# Patient Record
Sex: FEMALE | URBAN - METROPOLITAN AREA
[De-identification: names, ages, dates, MRNs, and addresses within clinical notes are randomized per-mention and may not be internally consistent; named-entity substitution may affect disease eponyms.]

---

## 2017-03-13 NOTE — PATIENT DISCUSSION
11 9 15 ^ IOP OD PROB STEROID INDUCED RECOM F/U [facility] TOMORROW GIVEN HX OF DIFFERENT DROP USE TO DETERMINE OPTIMUM TREATMENT AND F/U

## 2017-03-13 NOTE — PATIENT DISCUSSION
S/P 1ST ILUVIEN 4 15 15 DURING WHICH IMPLANT DID NOT COME OUT OF INJECTOR AND WAS FOUND IN THE INJECTION DEVICE BY THE

## 2017-03-13 NOTE — PROCEDURE NOTE: CLINICAL
PROCEDURE NOTE: Lucentis 0.3mg OD. Diagnosis: Diabetic Macular Edema. Anesthesia: Topical. Prep: Betadine Flush. Prior to injection, risks/benefits/alternatives discussed including infection, loss of vision, hemorrhage, cataract, glaucoma, retinal tears or detachment and patient wished to proceed. Topical anesthesia was induced with Alcaine. Additional anesthesia was achieved using drop(s) or injection checked above. A drop of Povidone-iodine 5% ophthalmic solution was instilled over the injection site and in the inferior fornix. Betadine prep was performed. Using the syringe provided, Lucentis 0.3 mg in 0.05 cc was injected into the vitreous cavity. The needle was passed 3.0 mm posterior to the limbus in pseudophakic patients, and 3.5 mm posterior to the limbus in phakic patients. The remainder of the Lucentis 0.3mg in the single-use vial was then discarded in a medical waste disposal container. A single use vial of intravitreal Lucentis 0.3mg/0.05ml was used and excess discarded. The eye was irrigated with sterile irrigating solution. Patient tolerated the procedure well. There were no complications. Post procedure instructions given. Injection Time 1:10PM. The patient was instructed to return for re-evaluation in approximately 4-12 weeks depending on his/her condition and was told to call immediately if vision decreases and/or if his/her eye becomes red, painful, and/or light sensitive. The patient was instructed to go to the emergency room or call 911 if unable to reach the doctor within an hour or two of trying or calling. The patient was instructed to use Artificial Tears q.i.d. p.r.n for comfort. Chito Lara

## 2017-03-13 NOTE — PATIENT DISCUSSION
We reviewed the signs and symptoms of retinal tear/retinal detachment and the importance of prompt evaluation should there be increasing floaters, new flashing lights, or decreasing peripheral vision in either eye at any time.

## 2017-03-13 NOTE — PATIENT DISCUSSION
DAN-Ref: Within THE NEXT DAY OR TWO, Phone # [phone] Secondary Phone # ( [phone] ), Type of referral:FOLLOW UP , Comments:EVALUATION FOR GLAUCOMA POSSIBLY CAUSED BY STEROID INJECTION . End DAN-Ref.

## 2017-06-19 NOTE — PATIENT DISCUSSION
S/P 1ST ILUVIEN 4 15 15 DURING WHICH IMPLANT DID NOT COME OUT OF INJECTOR AND WAS FOUND IN THE INJECTION DEVICE BY THE .

## 2017-06-19 NOTE — PATIENT DISCUSSION
LUCENTIS AND REEVAL IN 6 WKS TO SEE IF IT IS IMPROVING THAN THE EFFECT IS WEARING OFF  - MODERATE EDEMA WITOUT IMPROVEMENT.

## 2017-06-19 NOTE — PROCEDURE NOTE: CLINICAL
PROCEDURE NOTE: Lucentis 0.3mg OD. Diagnosis: Diabetic Macular Edema. Anesthesia: Topical. Prep: Betadine Flush. Prior to injection, risks/benefits/alternatives discussed including infection, loss of vision, hemorrhage, cataract, glaucoma, retinal tears or detachment and patient wished to proceed. Topical anesthesia was induced with Alcaine. Additional anesthesia was achieved using drop(s) or injection checked above. A drop of Povidone-iodine 5% ophthalmic solution was instilled over the injection site and in the inferior fornix. Betadine prep was performed. Using the syringe provided, Lucentis 0.3 mg in 0.05 cc was injected into the vitreous cavity. The needle was passed 3.0 mm posterior to the limbus in pseudophakic patients, and 3.5 mm posterior to the limbus in phakic patients. The remainder of the Lucentis 0.3mg in the single-use vial was then discarded in a medical waste disposal container. A single use vial of intravitreal Lucentis 0.3mg/0.05ml was used and excess discarded. The eye was irrigated with sterile irrigating solution. Patient tolerated the procedure well. There were no complications. Post procedure instructions given. Injection Time 11:14 AM. The patient was instructed to return for re-evaluation in approximately 4-12 weeks depending on his/her condition and was told to call immediately if vision decreases and/or if his/her eye becomes red, painful, and/or light sensitive. The patient was instructed to go to the emergency room or call 911 if unable to reach the doctor within an hour or two of trying or calling. The patient was instructed to use Artificial Tears q.i.d. p.r.n for comfort. Wilfred Monteiro

## 2017-06-19 NOTE — PATIENT DISCUSSION
DAN-Ref: Within THE NEXT DAY OR TWO, Phone # [phone] Secondary Phone # ( [phone] ), Type of referral:FOLLOW UP , Comments:EVALUATION FOR GLAUCOMA POSSIBLY CAUSED BY STEROID INJECTION . End DAN-Ref.

## 2017-06-19 NOTE — PATIENT DISCUSSION
11 9 15 ^ IOP OD PROB STEROID INDUCED RECOM F/U [facility] TOMORROW GIVEN HX OF DIFFERENT DROP USE TO DETERMINE OPTIMUM TREATMENT AND F/U.

## 2017-07-31 NOTE — PATIENT DISCUSSION
OZURDEX - MODERATE EDEMA WITH SMALL IMPROVMENT AT 6 WKS S/P LUCENTIS SUPPLMENT - TO SEE IF OZURDEX WORKS BETTER.

## 2017-07-31 NOTE — PATIENT DISCUSSION
ILUVIEN - IMCREASING EDEMA WITH LOSS 5 LETTERS - MEETS FAME STUDY RETX CRITERIA - NO SIG STEROID RESPONSE OS.

## 2017-07-31 NOTE — PROCEDURE NOTE: CLINICAL
PROCEDURE NOTE: Intravitreal Iluvien OS. Diagnosis: Diabetic Macular Edema. Patient has been previously treated with a course of corticosteroids and did not have a clinically significant rise in intraocular pressure. Anesthesia was achieved using drop(s) or injection checked above. Subconjunctival xylocaine 2% approx. 0.5cc was given for anesthesia. A drop of Proparacaine 0.5% was instilled followed by Povidone-iodine 5% over the injection site. The Iluvien drug implant (fluocinolone acetonide intravitreal implant 0.19mg) was injected into the vitreous cavity. The needle was passed 3.0 mm posterior to the limbus in pseudophakic patients, and 3.5 mm posterior to the limbus in phakic patients. The eye was stabilized using a q tip or cotton tip applicator, and the conjunctiva was displaced from the injection site. There was no evidence of hemorrhage, subretinal injection, or retinal detachment. Time of Injection *. The eye was then irrigated with a sterile eye wash, the patient tolerated the procedure well, and there were no complications from the procedure. The patient was instructed to follow up in approximately 2 weeks for an exam and/or pressure check and was told to call immediately if the vision decreases and/or if the patient's eye becomes red, painful, and/or light sensitive. If the patient is unable to reach the doctor within an hour or two, the patient was instructed to go to the emergency room or call 911. Patient was instructed to return in 6 weeks for a followup exam. The patient was instructed to use Artificial Tears q.i.d. p.r.n for comfort. ILUVIEN WELL SEEN IN VIT CAVITY DURING INJECTION. Kailyn Avendano

## 2017-10-05 NOTE — PROCEDURE NOTE: CLINICAL
PROCEDURE NOTE: Intravitreal Iluvien OD. Diagnosis: Diabetic Macular Edema. Anesthesia: Akten Gel 3.5%. Prep: Betadine Drops. Patient has been previously treated with a course of corticosteroids and did not have a clinically significant rise in intraocular pressure. Anesthesia was achieved using drop(s) or injection checked above. Subconjunctival xylocaine 2% approx. 0.5cc was given for anesthesia. A drop of Proparacaine 0.5% was instilled followed by Povidone-iodine 5% over the injection site. The Iluvien drug implant (fluocinolone acetonide intravitreal implant 0.19mg) was injected into the vitreous cavity. The needle was passed 3.0 mm posterior to the limbus in pseudophakic patients, and 3.5 mm posterior to the limbus in phakic patients. The eye was stabilized using a q tip or cotton tip applicator, and the conjunctiva was displaced from the injection site. There was no evidence of hemorrhage, subretinal injection, or retinal detachment. Time of Injection 11:10 AM. The eye was then irrigated with a sterile eye wash, the patient tolerated the procedure well, and there were no complications from the procedure. The patient was instructed to follow up in approximately 2 weeks for an exam and/or pressure check and was told to call immediately if the vision decreases and/or if the patient's eye becomes red, painful, and/or light sensitive. If the patient is unable to reach the doctor within an hour or two, the patient was instructed to go to the emergency room or call 911. Patient was instructed to return in 6 weeks for a followup exam. The patient was instructed to use Artificial Tears q.i.d. p.r.n for comfort. ILUVIEN SEEN DURING AND AFTER INJECTION WITH 20D LENS. Kyrie Ibarra

## 2017-10-05 NOTE — PATIENT DISCUSSION
ILUVEIEN - MODERATE EDEMA WITH OVER A 5 LETTER LOSS OF VA - RECOM REPEAT ILUVIEN AS OS DID WELL AFTER RECENT ILUVIEN OS.

## 2017-11-28 NOTE — PATIENT DISCUSSION
DUANE 10 5 17 - MODERATE EDEMA WITH OVER A 5 LETTER LOSS OF VA - RECOM REPEAT ILUVIEN AS OS DID WELL AFTER RECENT ILUVIEN OS.

## 2017-11-28 NOTE — PATIENT DISCUSSION
[address] [facility] 20[address] [place] [address].

## 2018-02-28 ENCOUNTER — MOHS SURGERY-ROUTINE (OUTPATIENT)
Dept: URBAN - METROPOLITAN AREA CLINIC 12 | Facility: CLINIC | Age: 63
Setting detail: DERMATOLOGY
End: 2018-02-28

## 2018-03-07 ENCOUNTER — SUTURE REMOVAL (OUTPATIENT)
Dept: URBAN - METROPOLITAN AREA CLINIC 12 | Facility: CLINIC | Age: 63
Setting detail: DERMATOLOGY
End: 2018-03-07

## 2018-03-07 DIAGNOSIS — L57.0 ACTINIC KERATOSIS: ICD-10-CM

## 2018-03-07 PROCEDURE — 99024 POSTOP FOLLOW-UP VISIT: CPT

## 2018-07-11 ENCOUNTER — OTHER- (OUTPATIENT)
Dept: URBAN - METROPOLITAN AREA CLINIC 12 | Facility: CLINIC | Age: 63
Setting detail: DERMATOLOGY
End: 2018-07-11

## 2018-07-11 DIAGNOSIS — L30.4 ERYTHEMA INTERTRIGO: ICD-10-CM

## 2018-07-11 DIAGNOSIS — L81.2 FRECKLES: ICD-10-CM

## 2018-07-11 PROCEDURE — 99212 OFFICE O/P EST SF 10 MIN: CPT

## 2018-11-09 NOTE — PATIENT DISCUSSION
12 5 16 ^ EDEMA WITH REDUCTION IN VA - RECOM L TX AS HAS DONE WELL WITH LUCENTIS IN PAST. normal... Well appearing, well nourished, awake, alert, oriented to person, place, time/situation and in no apparent distress.

## 2020-01-15 NOTE — PATIENT DISCUSSION
Outpatient Occupational Therapy Neuro Initial Evaluation   [place]     Patient Name: Cindy Mejia  : 1946  MRN: [id number]  Today's Date: 1/15/2020      Visit Date: 01/15/2020    Patient Active Problem List   Diagnosis   • Anxiety   • Tubular adenoma   • Tremor of both hands   • B12 deficiency   • Parkinson disease (CMS/HCC)   • Action tremor   • Mixed stress and urge urinary incontinence        Past Medical History:   Diagnosis Date   • Anxiety 2016   • Asymptomatic varicose veins of both lower extremities 2019   • Carcinoma of colon (CMS/HCC) 2018   • Colon polyp    • Constipation    • Menopause 3/23/2017   • Mixed stress and urge urinary incontinence    • Other hemorrhoids 2018   • Other rosacea 2018   • Uterine prolapse 2016        Past Surgical History:   Procedure Laterality Date   • BLADDER SURGERY     • COLONOSCOPY N/A 2017    Procedure: COLONOSCOPY, polypectomy;  Surgeon: Rabia Mcelroy MD;  Location: [facility];  Service:    • COLONOSCOPY N/A 2017    Procedure: COLONOSCOPY, polypectomy, biopsy, sclerotherapy injection;  Surgeon: Rabia Mcelroy MD;  Location: [facility] OR;  Service:    • HYSTERECTOMY     • TUBAL ABDOMINAL LIGATION           Visit Dx:      ICD-10-CM ICD-9-CM   1. Parkinson disease (CMS/HCC) G20 332.0   2. Upper extremity weakness R29.898 729.89       Patient History     Row Name 01/15/20 1100             History    Chief Complaint  Difficulty with daily activities;Muscle weakness;Joint stiffness  -SD      Date Current Problem(s) Began  -- symptoms have progressively worsened for approx 2 years  -SD      Brief Description of Current Complaint  Pt with history of Parkinsons and reports declining function over the past few years. She states she has some UE tremors and has noted UE weakness and decreased functional coordination which has impacted her performance with daily tasks. Pt also reports some intermittent numbness/tingling of BUE. Pt has  RECOM F/U DR BUNCH FOR ONGOING MANAGEMENT AND MONITORING OF GLAUCOMA. "an EMG scheduled in March 2020 regarding the UE sensory issues.  -SD      Patient/Caregiver Goals  Other (comment) \"move better\", \"write better\"  -SD      Current Tobacco Use  no  -SD      Smoking Status  never  -SD      Patient's Rating of General Health  Good  -SD      Hand Dominance  right-handed  -SD      Occupation/sports/leisure activities  retired hairdresser  -SD      Patient seeing anyone else for problem(s)?  Physical therapy  -SD      How has patient tried to help current problem?  home exercises  -SD      Are you or can you be pregnant  No  -SD         Pain     Pain Location  -- pt reports no pain at this time  -SD      Is your sleep disturbed?  No  -SD         Fall Risk Assessment    Any falls in the past year:  Yes  -SD      Number of falls reported in the last 12 months  1  -SD      Factors that contributed to the fall:  Tripped \"new shoe caught on the floor surface\"  -SD      Does patient have a fear of falling  Yes (comment) \"I always have a fear of falling\"   -SD         Services    Prior Rehab/Home Health Experiences  No  -SD      Are you currently receiving Home Health services  No  -SD         Daily Activities    Primary Language  English  -SD      Are you able to read  Yes  -SD      Are you able to write  Yes  -SD      How does patient learn best?  Listening  -SD      Teaching needs identified  Home Exercise Program;Management of Condition  -SD      Patient is concerned about/has problems with  Difficulty with self care (i.e. bathing, dressing, toileting:;Writing/grasping items with hand(s);Grasping objects lifting  -SD      Does patient have problems with the following?  Anxiety  -SD      Recommended Referrals  -- pt has consult with physical therapy  -SD      Pt Participated in POC and Goals  Yes  -SD         Safety    Are you being hurt, hit, or frightened by anyone at home or in your life?  No  -SD      Are you being neglected by a caregiver  No  -SD        User Key  (r) = Recorded By, (t) " = Taken By, (c) = Cosigned By    Initials Name Provider Type    Camilo Potter, OTR Occupational Therapist          OT Neuro     Row Name 01/15/20 1100             Subjective Comments    Subjective Comments  Pt states she has diffficulty with many everyday tasks due to hand tremors/weakness (ex, writing, cutting food, pulling up clothing, performing light grooming tasks)  -SD         Home Living    Living Arrangements  house  -SD         Cognitive Assessment/Intervention    Current Cognitive/Communication Assessment  functional  -SD      Orientation Status (Cognition)  oriented x 4  -SD         Sensation    Sensation WNL?  WFL  -SD      Additional Comments  Pt reports interittent numbness/tingling radiating up both UE's. No issues at present time  -SD         Coordination    Coordination Observations  Resting Tremor mild resting tremor  -SD      Resting Tremor  Bilteral: mild  -SD      Other Coordination Observations  decrease fmc skills  -SD      9-Hole Peg Left  43  -SD      9-Hole Peg Right  53  -SD         General ROM    GENERAL ROM COMMENTS  BUE rom wfl (with exception of hands/pt is not able to achieve a loose gross grasp with bilateral hands due to joint stiffness/weakness)  -SD         MMT (Manual Muscle Testing)    General MMT Comments  BUE 3+/5  -SD        User Key  (r) = Recorded By, (t) = Taken By, (c) = Cosigned By    Initials Name Provider Type    Camilo Potter, OTR Occupational Therapist             Hand Therapy (last 24 hours)      Hand Eval     Row Name 01/15/20 1100             Left Extension AROM    II- MP AROM  0  -SD      II- PIP AROM  0  -SD      II- DIP AROM  -17  -SD      II- RAI Left Extension AROM  -17  -SD      III- MP AROM  -12  -SD      III- PIP AROM  0  -SD      III- DIP AROM  -15  -SD      III- RAI Left Extension AROM  -27  -SD      IV- MP AROM  -10  -SD      IV- PIP AROM  0  -SD      IV- DIP AROM  -14  -SD      IV- RAI Left Extension AROM  -24  -SD      V- MP AROM  0  -SD       V- PIP AROM  0  -SD      V- DIP AROM  -19  -SD      V- RAI Left Extension AROM  -19  -SD         Left Flexion AROM    II- MP AROM  85  -SD      II- PIP AROM  50  -SD      II- DIP AROM  41  -SD      II- RAI Left Flexion AROM  176  -SD      III- MP AROM  95  -SD      III- PIP AROM  42  -SD      III- DIP AROM  35  -SD      III- RAI Left Flexion AROM  172  -SD      IV- MP AROM  95  -SD      IV- PIP AROM  34  -SD      IV- DIP AROM  24  -SD      IV- RAI Left Flexion AROM  153  -SD      V- MP AROM  95  -SD      V- PIP AROM  39  -SD      V- DIP AROM  25  -SD      V- RAI Left Flexion AROM  159  -SD         Right Extension AROM    II- MP AROM  -10  -SD      II- PIP AROM  0  -SD      II- DIP AROM  -5  -SD      II- RAI Right Extension AROM  -15  -SD      III- MP AROM  -10  -SD      III- PIP AROM  0  -SD      III- DIP AROM  -26  -SD      III- RAI Right Extension AROM  -36  -SD      IV- MP AROM  0  -SD      IV- PIP AROM  -10  -SD      IV- DIP AROM  -17  -SD      IV- RAI Right Extension AROM  -27  -SD      V- MP AROM  0  -SD      V- PIP AROM  -15  -SD      V- DIP AROM  -15  -SD      V- RAI Right Extension AROM  -30  -SD         Right Flexion AROM    II- MP AROM  84  -SD      II- PIP AROM  44  -SD      II- DIP AROM  31  -SD      II- RAI Right Flexion AROM  159  -SD      III- MP AROM  89  -SD      III- PIP AROM  44  -SD      III- DIP AROM  31  -SD      III- RAI Right Flexion AROM  164  -SD      IV- MP AROM  85  -SD      IV- PIP AROM  51  -SD      IV- DIP AROM  60  -SD      IV- RAI Right Flexion AROM  196  -SD      V- MP AROM  86  -SD      V- PIP AROM  39  -SD      V- DIP AROM  19  -SD      V- RAI Right Flexion AROM  144  -SD         Right Thumb AROM    MP Flexion - AROM  47  -SD      IP Flexion - AROM  33  -SD         Left Thumb AROM    MP Flexion - AROM  61  -SD      IP Flexion - AROM  33  -SD          Strength Right    # Reps  1  -SD      Right Rung  2  -SD      Right  Test 3  18  -SD       Strength Average Right   18  -SD          Strength Left    # Reps  1  -SD      Left Rung  2  -SD      Left  Test 3  15  -SD       Strength Average Left  15  -SD         Right Hand Strength - Pinch (lbs)    Lateral  10 lbs  -SD         Left Hand Strength - Pinch (lbs)    Lateral  9 lbs  -SD        User Key  (r) = Recorded By, (t) = Taken By, (c) = Cosigned By    Initials Name Provider Type    Camilo Potter OTR Occupational Therapist              Therapy Education  Education Details: Education regarding OT services, HEP to address hand rom/strength and compensatory strategies for UE tremors/hand weakness/joint stiffness  Given: HEP, Symptoms/condition management  Program: New  How Provided: Verbal, Demonstration  Provided to: Patient  Level of Understanding: Teach back education performed, Verbalized, Demonstrated    OT Goals     Row Name 01/15/20 1100          OT Short Term Goals    STG Date to Achieve  01/29/20  -SD     STG 1  Pt to be independent with HEP for hand rom/strength to increase independence with adl tasks.  -SD     STG 1 Progress  New  -SD     STG 2  Pt to improve functional coordination for writing activity using compensatory strategies to allow for participation in preferred leisure activity (soduko)  -SD     STG 2 Progress  New  -SD        Long Term Goals    LTG Date to Achieve  02/12/20  -SD     LTG 1  Pt to utilize AE/compensatory strategies to increase functional indepence with light grooming/adl tasks.  -SD     LTG 1 Progress  New  -SD     LTG 2  Pt to improve Bilateral hand strength by 5# to allow for improved performance of home management activity.  -SD     LTG 2 Progress  New  -SD       User Key  (r) = Recorded By, (t) = Taken By, (c) = Cosigned By    Initials Name Provider Type    Camilo Potter OTR Occupational Therapist                 OT Assessment/Plan     Row Name 01/15/20 1403          OT Assessment    Functional Limitations  Performance in self-care ADL;Limitation in home  management;Performance in leisure activities  -SD     Impairments  Range of motion;Muscle strength;Coordination  -SD     Assessment Comments  Pt presents with bilateral joint stiffness/weakness in her hands, and she has a history of Parkinson's disease with mild UE tremors. These factors are impacting her coordination and ability to manage basic adl tasks independently.   -SD     OT Diagnosis  Parkinson's, UE tremors, bilateral hand weakness/joint stiffness  -SD     OT Rehab Potential  Good  -SD     Patient/caregiver participated in establishment of treatment plan and goals  Yes  -SD     Patient would benefit from skilled therapy intervention  Yes  -SD        OT Plan    OT Frequency  1x/week  -SD     Predicted Duration of Therapy Intervention (Therapy Eval)  4 weeks  -SD     Planned CPT's?  OT EVAL LOW COMPLEXITY: 97809;OT THER ACT EA 15 MIN: [id number];OT HOT/COLD PACK  -SD     Planned Therapy Interventions (Optional Details)  home exercise program;ROM (Range of Motion);strengthening;other (see comments) Education with strategies to compensate for UE tremors/weakness/limited hand rom.  -SD     OT Plan Comments  Rec OT weekly x 4 weeks to establish BUE rom/strengthening program and  for education with compensatory strategies to increase her functional independence with basic daily tasks   -SD       User Key  (r) = Recorded By, (t) = Taken By, (c) = Cosigned By    Initials Name Provider Type    SD Camilo Da Silva, OTR Occupational Therapist        OT Exercises     Row Name 01/15/20 1100             Exercise 1    Exercise Name 1  Education with gentle arom of Bilateral hands  -SD         Exercise 2    Exercise Name 2  Education with hand strengthening program  -SD      Equipment 2  Theraputty light resistance  -SD      Resistance 2  Yellow  -SD         Exercise 3    Exercise Name 3  Education with light functional coordination activity  -SD        User Key  (r) = Recorded By, (t) = Taken By, (c) = Cosigned By     Initials Name Provider Type    Camilo Potter OTR Occupational Therapist            Outcome Measure Options: Quick DASH  9 Hole Peg  9-Hole Peg Left: 43  9-Hole Peg Right: 53  Quick DASH  Open a tight or new jar.: Severe Difficulty  Do heavy household chores (e.g., wash walls, wash floors): No Difficulty  Carry a shopping bag or briefcase: No Difficulty  Wash your back: Moderate Difficulty  Use a knife to cut food: Moderate Difficulty  During the past week, to what extent has your arm, shoulder, or hand problem interfered with your normal social activites with family, friends, neighbors or groups?: Slightly  During the past week, were you limited in your work or other regular daily activities as a result of your arm, shoulder or hand problem?: Slightly Limited  Arm, Shoulder, or hand pain: Moderate  Tingling (pins and needles) in your arm, shoulder, or hand: Moderate  During the past week, how much difficulty have you had sleeping because of the pain in your arm, shoulder or hand?: Mild Difficulty  Number of Questions Answered: 10  Quick DASH Score: 35         Time Calculation:   OT Start Time: 1100  OT Stop Time: 1150  OT Time Calculation (min): 50 min     Therapy Charges for Today     Code Description Service Date Service Provider Modifiers Qty    [id number]  OT EVAL LOW COMPLEXITY 3 1/15/2020 Camilo Da Silva OTR GO 1                     MILA Corcoran  1/15/2020

## 2020-09-29 RX ORDER — ISOTRETINOIN 40 MG/1
1 CAPSULE CAPSULE, LIQUID FILLED ORAL BID
Qty: 60 | Refills: 0 | Status: DISCONTINUED
Start: 2020-09-29 | End: 2020-10-29

## 2022-05-10 ENCOUNTER — NEW PATIENT (OUTPATIENT)
Dept: URBAN - METROPOLITAN AREA CLINIC 9 | Facility: CLINIC | Age: 67
End: 2022-05-10

## 2022-05-10 DIAGNOSIS — H52.7: ICD-10-CM

## 2022-05-10 DIAGNOSIS — H25.13: ICD-10-CM

## 2022-05-10 PROCEDURE — 99204 OFFICE O/P NEW MOD 45 MIN: CPT

## 2022-05-10 PROCEDURE — 92136 OPHTHALMIC BIOMETRY: CPT

## 2022-05-10 ASSESSMENT — KERATOMETRY
OD_K2POWER_DIOPTERS: 45.50
OD_AXISANGLE_DEGREES: 163
OS_AXISANGLE2_DEGREES: 93
OS_K1POWER_DIOPTERS: 44.75
OS_AXISANGLE_DEGREES: 3
OS_K2POWER_DIOPTERS: 45.50
OD_AXISANGLE2_DEGREES: 73
OD_K1POWER_DIOPTERS: 45.00

## 2022-05-10 ASSESSMENT — VISUAL ACUITY
OD_SC: 20/200
OS_CC: 20/40
OD_BCVA: 20/40
OD_CC: 20/50
OS_SC: 20/200
OS_BCVA: 20/30

## 2022-05-10 ASSESSMENT — TONOMETRY
OS_IOP_MMHG: 17
OD_IOP_MMHG: 16

## 2022-05-10 NOTE — PATIENT DISCUSSION
Successful history of monovision OD distance and OS near. Discussed simulating monovision at the time of catract surgery with IOL's. Options of monofocal and extended depth of focus lenses discussed. The pt is leaning towards vivity setting the right eye for plano and the left eye -1.00.

## 2022-07-06 RX ORDER — LEVOTHYROXINE SODIUM 0.1 MG/1
TABLET ORAL
COMMUNITY
End: 2022-10-07

## 2022-07-06 RX ORDER — SENNA PLUS 8.6 MG/1
TABLET ORAL
COMMUNITY

## 2022-07-29 PROBLEM — M54.50 LOW BACK PAIN WITHOUT SCIATICA: Status: ACTIVE | Noted: 2022-07-29

## 2022-07-29 PROBLEM — R79.89 ELEVATED TSH: Status: ACTIVE | Noted: 2022-07-29

## 2022-07-29 PROBLEM — E03.8 OTHER SPECIFIED HYPOTHYROIDISM: Status: ACTIVE | Noted: 2022-07-29

## 2022-07-29 PROBLEM — E03.9 ACQUIRED HYPOTHYROIDISM: Status: ACTIVE | Noted: 2022-07-29

## 2022-07-29 PROBLEM — N95.9 MENOPAUSAL AND POSTMENOPAUSAL DISORDER: Status: ACTIVE | Noted: 2022-07-29

## 2022-07-29 PROBLEM — R53.83 FATIGUE: Status: ACTIVE | Noted: 2022-07-29

## 2022-07-29 PROBLEM — E78.5 HYPERLIPIDEMIA: Status: ACTIVE | Noted: 2022-07-29

## 2022-07-29 PROBLEM — R92.8 ABNORMAL ULTRASOUND OF BREAST: Status: ACTIVE | Noted: 2022-07-29

## 2022-07-29 PROBLEM — B35.1 FUNGAL NAIL INFECTION: Status: ACTIVE | Noted: 2022-07-29

## 2022-07-29 PROBLEM — L60.3 BRITTLE NAILS: Status: ACTIVE | Noted: 2022-07-29

## 2022-07-29 PROBLEM — M21.611 BUNION OF RIGHT FOOT: Status: ACTIVE | Noted: 2022-07-29

## 2022-07-29 PROBLEM — Z00.00 ENCOUNTER FOR GENERAL ADULT MEDICAL EXAMINATION W/O ABNORMAL FINDINGS: Status: ACTIVE | Noted: 2018-10-17

## 2022-07-29 PROBLEM — Z12.31 OTHER SCREENING MAMMOGRAM: Status: ACTIVE | Noted: 2017-10-17

## 2022-07-29 PROBLEM — C44.91 BASAL CELL ADENOCARCINOMA: Status: ACTIVE | Noted: 2022-07-29

## 2022-07-29 PROBLEM — Z00.01 ENCOUNTER FOR GENERAL ADULT MEDICAL EXAMINATION WITH ABNORMAL FINDINGS: Status: ACTIVE | Noted: 2019-11-13

## 2022-07-29 PROBLEM — Z00.01 ENCOUNTER FOR GENERAL ADULT MEDICAL EXAMINATION WITH ABNORMAL FINDINGS: Status: ACTIVE | Noted: 2017-10-17

## 2022-07-29 PROBLEM — H61.23 IMPACTED CERUMEN OF BOTH EARS: Status: ACTIVE | Noted: 2022-07-29

## 2022-07-29 PROBLEM — C44.311 BASAL CELL CARCINOMA OF NOSE: Status: ACTIVE | Noted: 2022-07-29

## 2022-07-29 PROBLEM — C50.919: Status: ACTIVE | Noted: 2022-07-29

## 2022-07-29 PROBLEM — C50.412 MALIGNANT NEOPLASM OF UPPER-OUTER QUADRANT OF LEFT FEMALE BREAST (HCC): Status: ACTIVE | Noted: 2022-07-29

## 2022-08-28 PROBLEM — Z00.01 ENCOUNTER FOR GENERAL ADULT MEDICAL EXAMINATION WITH ABNORMAL FINDINGS: Status: RESOLVED | Noted: 2019-11-13 | Resolved: 2022-08-28

## 2022-08-28 PROBLEM — Z00.00 ENCOUNTER FOR GENERAL ADULT MEDICAL EXAMINATION W/O ABNORMAL FINDINGS: Status: RESOLVED | Noted: 2018-10-17 | Resolved: 2022-08-28

## 2025-02-04 NOTE — PATIENT DISCUSSION
Anesthesia Pre Eval Note    Anesthesia ROS/Med Hx        Anesthetic Complication History:    Patient does not have a history of anesthetic complications      Pulmonary Review:  Patient does not have a pulmonary history      Neuro/Psych Review:  Patient does not have a neuro/psych history         Cardiovascular Review:  Patient does not have a cardiovascular history   Exercise tolerance: good (>4 METS)    GI/HEPATIC/RENAL Review:  Patient does not have a GI/hepatic/renalhistory       End/Other Review:  Patient does not have an endo/other history    Positive for substance use - cannabis/cannabinoids  Additional Results:      ALLERGIES:  Not on File   Last Labs        Component                Value               Date/Time                  WBC                      6.3                 02/14/2024 1012            RBC                      5.40                02/14/2024 1012            HGB                      15.2                02/14/2024 1012            HCT                      42.3                02/14/2024 1012            MCV                      78.3                02/14/2024 1012            MCH                      28.1                02/14/2024 1012            MCHC                     35.9                02/14/2024 1012            RDW-CV                   14.0                02/14/2024 1012            Sodium                   140                 02/14/2024 1012            Potassium                4.3                 02/14/2024 1012            Chloride                 103                 02/14/2024 1012            Carbon Dioxide           28                  02/14/2024 1012            Glucose                  86                  02/14/2024 1012            BUN                      16                  02/14/2024 1012            Creatinine               1.16                02/14/2024 1012            Glomerular Filtrati*     86                  02/14/2024 1012            Calcium                  9.1                  Recommended observation. Patient understands condition, prognosis and need for follow up care. 02/14/2024 1012            PLT                      269                 02/14/2024 1012        No past medical history on file.  No past surgical history on file.   Prior to Admission medications :  Not on File       No data found.  Social history reviewed:  Social History     Tobacco Use   Smoking Status Not on file   Smokeless Tobacco Not on file      No existing history information found.  Social History     Substance and Sexual Activity   Alcohol Use Not on file           Relevant Problems   No relevant active problems       Physical Exam     Airway   Mallampati: II  TM Distance: >3 FB  Neck ROM: Full  Neck: Non-tender and Able to place in sniff position  TMJ Mobility: Good    Cardiovascular  Cardiovascular exam normal  Cardio Rhythm: Regular  Cardio Rate: Normal    Head Assessment  Head assessment: Normocephalic and Atraumatic    General Assessment  General Assessment: Alert and oriented and No acute distress    Dental Exam  Dental exam normal  Patient has:  Denied broken/chipped/loose teeth    Pulmonary Exam  Pulmonary exam normal  Breath sounds clear to auscultation:  Yes    Abdominal Exam  Abdominal exam normal      Anesthesia Plan:    ASA Status: 2  Anesthesia Type: MAC    Induction: Intravenous  Preferred Airway Type: Nasal Cannula  Maintenance: TIVA  Premedication: None      Post-op Pain Management: Per Surgeon      Checklist  Reviewed: Patient Summary, Allergies, Medications, Problem list and NPO Status  Consent/Risks Discussed Statement:  The proposed anesthetic plan, including its risks and benefits, have been discussed with the Patient along with the risks and benefits of alternatives. Questions were encouraged and answered and the patient and/or representative understands and agrees to proceed.    I have discussed elements of the patient's history or examination, as noted above and/or as follows, that place the patient at higher risk of complications; smoking.    I discussed with the patient (and/or  patient's legal representative) the risks and benefits of the proposed anesthesia plan, MAC, which may include services performed by other anesthesia providers.    Alternative anesthesia plans, if available, were reviewed with the patient (and/or patient's legal representative). Discussion has been held with the patient (and/or patient's legal representative) regarding risks of anesthesia, which include allergic reaction, anxiety, aspiration, conversion to general anesthesia, dental injury, depressed breathing, emergence delirium, hypotension, intra-operative awareness, nausea, oral injury, organ damage, persistent pain and headache and emergent situations that may require change in anesthesia plan.    The patient (and/or patient's legal representative) has indicated understanding, his/her questions have been answered, and he/she wishes to proceed with the planned anesthetic.    Blood Products: Not Anticipated